# Patient Record
Sex: MALE | Race: WHITE | NOT HISPANIC OR LATINO | URBAN - METROPOLITAN AREA
[De-identification: names, ages, dates, MRNs, and addresses within clinical notes are randomized per-mention and may not be internally consistent; named-entity substitution may affect disease eponyms.]

---

## 2017-04-21 ENCOUNTER — OFFICE VISIT (OUTPATIENT)
Dept: INTERNAL MEDICINE | Facility: CLINIC | Age: 22
End: 2017-04-21

## 2017-04-21 VITALS
WEIGHT: 158 LBS | HEART RATE: 62 BPM | DIASTOLIC BLOOD PRESSURE: 80 MMHG | SYSTOLIC BLOOD PRESSURE: 138 MMHG | HEIGHT: 73 IN | OXYGEN SATURATION: 94 % | BODY MASS INDEX: 20.94 KG/M2

## 2017-04-21 DIAGNOSIS — F90.2 ATTENTION DEFICIT HYPERACTIVITY DISORDER (ADHD), COMBINED TYPE: Primary | ICD-10-CM

## 2017-04-21 PROCEDURE — 99203 OFFICE O/P NEW LOW 30 MIN: CPT | Performed by: INTERNAL MEDICINE

## 2017-04-21 RX ORDER — BUPROPION HYDROCHLORIDE 150 MG/1
TABLET ORAL
Qty: 60 TABLET | Refills: 3 | Status: SHIPPED | OUTPATIENT
Start: 2017-04-21 | End: 2017-05-25 | Stop reason: SDUPTHER

## 2017-04-21 NOTE — PROGRESS NOTES
"Establish Care; concentration; forgetfulness; and Autistic Spectrum    Subjective   Henry Paz is a 22 y.o. male is here today to establish.    History of Present Illness   Henry is here to establish. Notes that he has noted he would lose his concentration and fade off rather than focus on the task at hand.  Friends at work, thought he may have attention issues. He was a good student at school, did not have any problems , majorly with studies, but does note he needed a lot of help.      Current Outpatient Prescriptions:   •  buPROPion XL (WELLBUTRIN XL) 150 MG 24 hr tablet, Take 1 PO daily for 1 week then 2 tabs daily, Disp: 60 tablet, Rfl: 3      The following portions of the patient's history were reviewed and updated as appropriate: allergies, current medications, past family history, past medical history, past social history, past surgical history and problem list.    Review of Systems   Constitutional: Negative.  Negative for chills and fever.   HENT: Negative for ear discharge, ear pain, sinus pressure and sore throat.    Respiratory: Negative for cough, chest tightness and shortness of breath.    Cardiovascular: Negative for chest pain, palpitations and leg swelling.   Gastrointestinal: Negative for diarrhea, nausea and vomiting.   Musculoskeletal: Negative for arthralgias, back pain and myalgias.   Neurological: Negative for dizziness, syncope and headaches.   Psychiatric/Behavioral: Positive for decreased concentration. Negative for confusion and sleep disturbance. The patient is nervous/anxious.        Objective   /80  Pulse 62  Ht 73\" (185.4 cm)  Wt 158 lb (71.7 kg)  SpO2 94% Comment: ra  BMI 20.85 kg/m2     Physical Exam   Constitutional: He is oriented to person, place, and time. He appears well-developed and well-nourished.   HENT:   Head: Normocephalic and atraumatic.   Right Ear: External ear normal.   Left Ear: External ear normal.   Mouth/Throat: No oropharyngeal exudate.   Eyes: " Conjunctivae are normal. Pupils are equal, round, and reactive to light.   Neck: Neck supple. No thyromegaly present.   Cardiovascular: Normal rate and regular rhythm.    Pulmonary/Chest: Effort normal and breath sounds normal.   Musculoskeletal: He exhibits no edema.   Neurological: He is alert and oriented to person, place, and time. No cranial nerve deficit.   Psychiatric: Judgment normal. His mood appears anxious.   Nursing note and vitals reviewed.        No results found for this or any previous visit.          Assessment/Plan   Diagnoses and all orders for this visit:    Attention deficit hyperactivity disorder (ADHD), combined type  -     buPROPion XL (WELLBUTRIN XL) 150 MG 24 hr tablet; Take 1 PO daily for 1 week then 2 tabs daily             Checked ADD questionaire- scored 24 on the inattention and 24 on hyperactivity c/w ADHD.  He does not note digestion issues, but may benefit from allergy vs celiac panel with his labs at follow up.      Return in about 1 month (around 5/21/2017) for Recheck. will check labs and BP.

## 2017-05-25 ENCOUNTER — OFFICE VISIT (OUTPATIENT)
Dept: INTERNAL MEDICINE | Facility: CLINIC | Age: 22
End: 2017-05-25

## 2017-05-25 VITALS
BODY MASS INDEX: 19.87 KG/M2 | OXYGEN SATURATION: 99 % | HEART RATE: 90 BPM | DIASTOLIC BLOOD PRESSURE: 80 MMHG | WEIGHT: 150.6 LBS | SYSTOLIC BLOOD PRESSURE: 130 MMHG

## 2017-05-25 DIAGNOSIS — F90.2 ATTENTION DEFICIT HYPERACTIVITY DISORDER (ADHD), COMBINED TYPE: ICD-10-CM

## 2017-05-25 LAB
ALBUMIN SERPL-MCNC: 5 G/DL (ref 3.2–4.8)
ALBUMIN/GLOB SERPL: 1.9 G/DL (ref 1.5–2.5)
ALP SERPL-CCNC: 54 U/L (ref 25–100)
ALT SERPL W P-5'-P-CCNC: 11 U/L (ref 7–40)
ANION GAP SERPL CALCULATED.3IONS-SCNC: 3 MMOL/L (ref 3–11)
AST SERPL-CCNC: 14 U/L (ref 0–33)
BILIRUB SERPL-MCNC: 1 MG/DL (ref 0.3–1.2)
BUN BLD-MCNC: 15 MG/DL (ref 9–23)
BUN/CREAT SERPL: 16.7 (ref 7–25)
CALCIUM SPEC-SCNC: 10.6 MG/DL (ref 8.7–10.4)
CHLORIDE SERPL-SCNC: 108 MMOL/L (ref 99–109)
CO2 SERPL-SCNC: 29 MMOL/L (ref 20–31)
CREAT BLD-MCNC: 0.9 MG/DL (ref 0.6–1.3)
DEPRECATED RDW RBC AUTO: 41 FL (ref 37–54)
ERYTHROCYTE [DISTWIDTH] IN BLOOD BY AUTOMATED COUNT: 12.2 % (ref 11.3–14.5)
GFR SERPL CREATININE-BSD FRML MDRD: 108 ML/MIN/1.73
GLOBULIN UR ELPH-MCNC: 2.7 GM/DL
GLUCOSE BLD-MCNC: 99 MG/DL (ref 70–100)
HCT VFR BLD AUTO: 44.8 % (ref 38.9–50.9)
HGB BLD-MCNC: 15.2 G/DL (ref 13.1–17.5)
MCH RBC QN AUTO: 31 PG (ref 27–31)
MCHC RBC AUTO-ENTMCNC: 33.9 G/DL (ref 32–36)
MCV RBC AUTO: 91.2 FL (ref 80–99)
PLATELET # BLD AUTO: 208 10*3/MM3 (ref 150–450)
PMV BLD AUTO: 10.9 FL (ref 6–12)
POTASSIUM BLD-SCNC: 4.5 MMOL/L (ref 3.5–5.5)
PROT SERPL-MCNC: 7.7 G/DL (ref 5.7–8.2)
RBC # BLD AUTO: 4.91 10*6/MM3 (ref 4.2–5.76)
SODIUM BLD-SCNC: 140 MMOL/L (ref 132–146)
TSH SERPL DL<=0.05 MIU/L-ACNC: 1.56 MIU/ML (ref 0.35–5.35)
VIT B12 BLD-MCNC: 486 PG/ML (ref 211–911)
WBC NRBC COR # BLD: 4.67 10*3/MM3 (ref 4.5–13.5)

## 2017-05-25 PROCEDURE — 80053 COMPREHEN METABOLIC PANEL: CPT | Performed by: INTERNAL MEDICINE

## 2017-05-25 PROCEDURE — 85027 COMPLETE CBC AUTOMATED: CPT | Performed by: INTERNAL MEDICINE

## 2017-05-25 PROCEDURE — 99213 OFFICE O/P EST LOW 20 MIN: CPT | Performed by: INTERNAL MEDICINE

## 2017-05-25 PROCEDURE — 82607 VITAMIN B-12: CPT | Performed by: INTERNAL MEDICINE

## 2017-05-25 PROCEDURE — 84443 ASSAY THYROID STIM HORMONE: CPT | Performed by: INTERNAL MEDICINE

## 2017-05-25 RX ORDER — BUPROPION HYDROCHLORIDE 300 MG/1
300 TABLET ORAL EVERY MORNING
Qty: 30 TABLET | Refills: 5 | Status: SHIPPED | OUTPATIENT
Start: 2017-05-25 | End: 2017-06-27 | Stop reason: SDUPTHER

## 2017-05-25 RX ORDER — ATOMOXETINE 40 MG/1
40 CAPSULE ORAL DAILY
Qty: 30 CAPSULE | Refills: 2 | Status: SHIPPED | OUTPATIENT
Start: 2017-05-25 | End: 2017-06-27 | Stop reason: CLARIF

## 2017-05-26 ENCOUNTER — TELEPHONE (OUTPATIENT)
Dept: INTERNAL MEDICINE | Facility: CLINIC | Age: 22
End: 2017-05-26

## 2017-05-31 ENCOUNTER — TELEPHONE (OUTPATIENT)
Dept: INTERNAL MEDICINE | Facility: CLINIC | Age: 22
End: 2017-05-31

## 2017-06-02 NOTE — TELEPHONE ENCOUNTER
Henry's mother called today to follow up on the call earlier this week regarding the cost of Henry's medication. She also has questions regarding the Welbutrin he is taking.     [phone] Celia Paz

## 2017-06-02 NOTE — TELEPHONE ENCOUNTER
Spoke to Celia, states the script is $80.00 after insurance paid.  Advised of Cherelle lopez, states she will  on Monday.

## 2017-06-06 NOTE — TELEPHONE ENCOUNTER
CLARISA'S MOTHER STATES THAT AFTER COUPON, THIS RX IS STILL TOO EXPENSIVE, CLARISA IS STILL TAKING WELLBUTRIN. SHE WOULD LIKE TO KNOW IF THERE IS AN ALTERNATIVE TO STRATERA.

## 2017-06-06 NOTE — TELEPHONE ENCOUNTER
The alternatives are stimulants. Can he come in Thursday or Friday am, and I can start him onit.    thanks

## 2017-06-06 NOTE — TELEPHONE ENCOUNTER
Is there anything else he can take?  Pt has told his mother that Wellbutrin is not helping either.

## 2017-06-07 NOTE — TELEPHONE ENCOUNTER
Left a detailed message for Mom to call back to sched & I would work pt in for tomorrow or Friday.

## 2017-06-27 ENCOUNTER — OFFICE VISIT (OUTPATIENT)
Dept: INTERNAL MEDICINE | Facility: CLINIC | Age: 22
End: 2017-06-27

## 2017-06-27 VITALS
SYSTOLIC BLOOD PRESSURE: 144 MMHG | DIASTOLIC BLOOD PRESSURE: 82 MMHG | OXYGEN SATURATION: 99 % | HEART RATE: 86 BPM | BODY MASS INDEX: 20.08 KG/M2 | WEIGHT: 152.2 LBS

## 2017-06-27 DIAGNOSIS — F90.2 ATTENTION DEFICIT HYPERACTIVITY DISORDER (ADHD), COMBINED TYPE: Primary | ICD-10-CM

## 2017-06-27 PROCEDURE — 99213 OFFICE O/P EST LOW 20 MIN: CPT | Performed by: INTERNAL MEDICINE

## 2017-06-27 RX ORDER — BUPROPION HYDROCHLORIDE 150 MG/1
150 TABLET ORAL EVERY MORNING
Qty: 30 TABLET | Refills: 1 | Status: SHIPPED | OUTPATIENT
Start: 2017-06-27 | End: 2017-08-07

## 2017-06-27 NOTE — PROGRESS NOTES
ADHD    Subjective   Henry Paz is a 22 y.o. male is here today for follow-up.    History of Present Illness   hERE FOR F/U ON HIS add, StRATTERA TOO EXPENSIVE, WELLBUTRIN NOT HELPING    Current Outpatient Prescriptions:   •  buPROPion XL (WELLBUTRIN XL) 150 MG 24 hr tablet, Take 1 tablet by mouth Every Morning., Disp: 30 tablet, Rfl: 1  •  lisdexamfetamine (VYVANSE) 20 MG capsule, Take 1 capsule by mouth Every Morning., Disp: 30 capsule, Rfl: 0      The following portions of the patient's history were reviewed and updated as appropriate: allergies, current medications, past family history, past medical history, past social history, past surgical history and problem list.    Review of Systems   Constitutional: Negative.  Negative for chills and fever.   HENT: Negative for ear discharge, ear pain, sinus pressure and sore throat.    Respiratory: Negative for cough, chest tightness and shortness of breath.    Cardiovascular: Negative for chest pain, palpitations and leg swelling.   Gastrointestinal: Negative for diarrhea, nausea and vomiting.   Musculoskeletal: Negative for arthralgias, back pain and myalgias.   Neurological: Negative for dizziness, syncope and headaches.   Psychiatric/Behavioral: Positive for decreased concentration. Negative for confusion and sleep disturbance.       Objective   /82  Pulse 86  Wt 152 lb 3.2 oz (69 kg)  SpO2 99% Comment: ra  BMI 20.08 kg/m2  Physical Exam   Constitutional: He is oriented to person, place, and time. He appears well-developed and well-nourished.   HENT:   Head: Normocephalic and atraumatic.   Right Ear: External ear normal.   Left Ear: External ear normal.   Mouth/Throat: No oropharyngeal exudate.   Eyes: Conjunctivae are normal. Pupils are equal, round, and reactive to light.   Neck: Neck supple. No thyromegaly present.   Cardiovascular: Normal rate, regular rhythm and intact distal pulses.    Pulmonary/Chest: Effort normal and breath sounds normal.    Abdominal: Soft. Bowel sounds are normal. He exhibits no distension. There is no tenderness.   Musculoskeletal: He exhibits no edema.   Neurological: He is alert and oriented to person, place, and time. No cranial nerve deficit.   Skin: Skin is warm and dry.   Psychiatric: He has a normal mood and affect. Judgment normal.   Nursing note and vitals reviewed.        Results for orders placed or performed in visit on 05/25/17   TSH   Result Value Ref Range    TSH 1.558 0.350 - 5.350 mIU/mL   Comprehensive Metabolic Panel   Result Value Ref Range    Glucose 99 70 - 100 mg/dL    BUN 15 9 - 23 mg/dL    Creatinine 0.90 0.60 - 1.30 mg/dL    Sodium 140 132 - 146 mmol/L    Potassium 4.5 3.5 - 5.5 mmol/L    Chloride 108 99 - 109 mmol/L    CO2 29.0 20.0 - 31.0 mmol/L    Calcium 10.6 (H) 8.7 - 10.4 mg/dL    Total Protein 7.7 5.7 - 8.2 g/dL    Albumin 5.00 (H) 3.20 - 4.80 g/dL    ALT (SGPT) 11 7 - 40 U/L    AST (SGOT) 14 0 - 33 U/L    Alkaline Phosphatase 54 25 - 100 U/L    Total Bilirubin 1.0 0.3 - 1.2 mg/dL    eGFR Non African Amer 108 >60 mL/min/1.73    Globulin 2.7 gm/dL    A/G Ratio 1.9 1.5 - 2.5 g/dL    BUN/Creatinine Ratio 16.7 7.0 - 25.0    Anion Gap 3.0 3.0 - 11.0 mmol/L   CBC (No Diff)   Result Value Ref Range    WBC 4.67 4.50 - 13.50 10*3/mm3    RBC 4.91 4.20 - 5.76 10*6/mm3    Hemoglobin 15.2 13.1 - 17.5 g/dL    Hematocrit 44.8 38.9 - 50.9 %    MCV 91.2 80.0 - 99.0 fL    MCH 31.0 27.0 - 31.0 pg    MCHC 33.9 32.0 - 36.0 g/dL    RDW 12.2 11.3 - 14.5 %    RDW-SD 41.0 37.0 - 54.0 fl    MPV 10.9 6.0 - 12.0 fL    Platelets 208 150 - 450 10*3/mm3   Vitamin B12   Result Value Ref Range    Vitamin B-12 486 211 - 911 pg/mL             Assessment/Plan   Diagnoses and all orders for this visit:    Attention deficit hyperactivity disorder (ADHD), combined type  -     lisdexamfetamine (VYVANSE) 20 MG capsule; Take 1 capsule by mouth Every Morning.  -     buPROPion XL (WELLBUTRIN XL) 150 MG 24 hr tablet; Take 1 tablet by mouth  Every Morning.           Wean off wellbutrin., cut back to 150 daily.  Monitor BP and call if high.      Return in about 1 month (around 7/27/2017) for Recheck.

## 2017-06-30 ENCOUNTER — TELEPHONE (OUTPATIENT)
Dept: INTERNAL MEDICINE | Facility: CLINIC | Age: 22
End: 2017-06-30

## 2017-06-30 DIAGNOSIS — F90.2 ATTENTION DEFICIT HYPERACTIVITY DISORDER (ADHD), COMBINED TYPE: Primary | ICD-10-CM

## 2017-06-30 RX ORDER — DEXTROAMPHETAMINE SACCHARATE, AMPHETAMINE ASPARTATE MONOHYDRATE, DEXTROAMPHETAMINE SULFATE AND AMPHETAMINE SULFATE 3.75; 3.75; 3.75; 3.75 MG/1; MG/1; MG/1; MG/1
15 CAPSULE, EXTENDED RELEASE ORAL EVERY MORNING
Qty: 30 CAPSULE | Refills: 0 | Status: SHIPPED | OUTPATIENT
Start: 2017-06-30 | End: 2017-08-07 | Stop reason: SDUPTHER

## 2017-07-03 ENCOUNTER — TELEPHONE (OUTPATIENT)
Dept: INTERNAL MEDICINE | Facility: CLINIC | Age: 22
End: 2017-07-03

## 2017-07-03 NOTE — TELEPHONE ENCOUNTER
PATIENTS MOTHER SAYS THE ADEROL IS CHEAPER THAN THE OTHER MEDICINE SO PATIENT RATHER HAS ADEROL. PATIENTS MOTHER SAID THEY NEED THE PRE AUTHORIZATION FOR THE MEDICINE AND THE PHARMACY JUST FAXED IT TO THE OFFICE. PATIENTS MOTHER SAYS PATIENT NEEDS MEDICINE AS AS POSSIBLE. THANK YOU.

## 2017-08-07 ENCOUNTER — OFFICE VISIT (OUTPATIENT)
Dept: INTERNAL MEDICINE | Facility: CLINIC | Age: 22
End: 2017-08-07

## 2017-08-07 VITALS
WEIGHT: 156 LBS | SYSTOLIC BLOOD PRESSURE: 120 MMHG | BODY MASS INDEX: 20.58 KG/M2 | HEART RATE: 63 BPM | OXYGEN SATURATION: 99 % | DIASTOLIC BLOOD PRESSURE: 78 MMHG

## 2017-08-07 DIAGNOSIS — F90.2 ATTENTION DEFICIT HYPERACTIVITY DISORDER (ADHD), COMBINED TYPE: ICD-10-CM

## 2017-08-07 DIAGNOSIS — Z82.69 FAMILY HISTORY OF SCOLIOSIS: Primary | ICD-10-CM

## 2017-08-07 PROCEDURE — 99213 OFFICE O/P EST LOW 20 MIN: CPT | Performed by: INTERNAL MEDICINE

## 2017-08-07 RX ORDER — DEXTROAMPHETAMINE SACCHARATE, AMPHETAMINE ASPARTATE MONOHYDRATE, DEXTROAMPHETAMINE SULFATE AND AMPHETAMINE SULFATE 5; 5; 5; 5 MG/1; MG/1; MG/1; MG/1
20 CAPSULE, EXTENDED RELEASE ORAL EVERY MORNING
Qty: 30 CAPSULE | Refills: 0 | Status: SHIPPED | OUTPATIENT
Start: 2017-08-07 | End: 2017-09-07 | Stop reason: SDUPTHER

## 2017-08-07 NOTE — PROGRESS NOTES
Follow-up (ADHD)    Subjective   Henry Paz is a 22 y.o. male is here today for follow-up.    History of Present Illness   Henry was started on the Adderall lat visit, and notes he does well the 1st 2-4 hrs on the med, and then it wears off.Feels less distracted by the things around him when it works.  Wellbutrin did not help. Was able to wean off of it entirely.  Also , has a fam h/o scolioses, and would like to know if he does?    Current Outpatient Prescriptions:   •  amphetamine-dextroamphetamine XR (ADDERALL XR) 20 MG 24 hr capsule, Take 1 capsule by mouth Every Morning., Disp: 30 capsule, Rfl: 0      The following portions of the patient's history were reviewed and updated as appropriate: allergies, current medications, past family history, past medical history, past social history, past surgical history and problem list.    Review of Systems   Constitutional: Negative for activity change, appetite change, fatigue and unexpected weight change.   Cardiovascular: Negative for chest pain and palpitations.   Musculoskeletal: Negative for back pain, gait problem and myalgias.   Psychiatric/Behavioral: Positive for decreased concentration. The patient is not nervous/anxious.        Objective   /78  Pulse 63  Wt 156 lb (70.8 kg)  SpO2 99%  BMI 20.58 kg/m2  Physical Exam   Constitutional: He is oriented to person, place, and time. He appears well-developed and well-nourished.   HENT:   Head: Normocephalic and atraumatic.   Mouth/Throat: No oropharyngeal exudate.   Eyes: Conjunctivae are normal. Pupils are equal, round, and reactive to light.   Neck: Neck supple. No thyromegaly present.   Cardiovascular: Normal rate and regular rhythm.    Pulmonary/Chest: Effort normal and breath sounds normal.   Abdominal: Soft. Bowel sounds are normal. He exhibits no distension. There is no tenderness.   Musculoskeletal: He exhibits no edema or tenderness (mildly curved T-spine to the rt).   Neurological: He is alert  and oriented to person, place, and time. No cranial nerve deficit.   Skin: Skin is warm and dry.   Psychiatric: He has a normal mood and affect. Judgment normal.   Nursing note and vitals reviewed.        Results for orders placed or performed in visit on 05/25/17   TSH   Result Value Ref Range    TSH 1.558 0.350 - 5.350 mIU/mL   Comprehensive Metabolic Panel   Result Value Ref Range    Glucose 99 70 - 100 mg/dL    BUN 15 9 - 23 mg/dL    Creatinine 0.90 0.60 - 1.30 mg/dL    Sodium 140 132 - 146 mmol/L    Potassium 4.5 3.5 - 5.5 mmol/L    Chloride 108 99 - 109 mmol/L    CO2 29.0 20.0 - 31.0 mmol/L    Calcium 10.6 (H) 8.7 - 10.4 mg/dL    Total Protein 7.7 5.7 - 8.2 g/dL    Albumin 5.00 (H) 3.20 - 4.80 g/dL    ALT (SGPT) 11 7 - 40 U/L    AST (SGOT) 14 0 - 33 U/L    Alkaline Phosphatase 54 25 - 100 U/L    Total Bilirubin 1.0 0.3 - 1.2 mg/dL    eGFR Non African Amer 108 >60 mL/min/1.73    Globulin 2.7 gm/dL    A/G Ratio 1.9 1.5 - 2.5 g/dL    BUN/Creatinine Ratio 16.7 7.0 - 25.0    Anion Gap 3.0 3.0 - 11.0 mmol/L   CBC (No Diff)   Result Value Ref Range    WBC 4.67 4.50 - 13.50 10*3/mm3    RBC 4.91 4.20 - 5.76 10*6/mm3    Hemoglobin 15.2 13.1 - 17.5 g/dL    Hematocrit 44.8 38.9 - 50.9 %    MCV 91.2 80.0 - 99.0 fL    MCH 31.0 27.0 - 31.0 pg    MCHC 33.9 32.0 - 36.0 g/dL    RDW 12.2 11.3 - 14.5 %    RDW-SD 41.0 37.0 - 54.0 fl    MPV 10.9 6.0 - 12.0 fL    Platelets 208 150 - 450 10*3/mm3   Vitamin B12   Result Value Ref Range    Vitamin B-12 486 211 - 911 pg/mL             Assessment/Plan   Diagnoses and all orders for this visit:    Family history of scoliosis  -     XR spine scoliosis 2 or 3 views; Future    Attention deficit hyperactivity disorder (ADHD), combined type  -     amphetamine-dextroamphetamine XR (ADDERALL XR) 20 MG 24 hr capsule; Take 1 capsule by mouth Every Morning.      Increase dose and rtc in mo for recheck, change med if no improvement.           Return in about 1 month (around 9/7/2017) for Recheck.

## 2017-09-07 ENCOUNTER — OFFICE VISIT (OUTPATIENT)
Dept: INTERNAL MEDICINE | Facility: CLINIC | Age: 22
End: 2017-09-07

## 2017-09-07 VITALS
HEART RATE: 62 BPM | DIASTOLIC BLOOD PRESSURE: 68 MMHG | WEIGHT: 158.07 LBS | BODY MASS INDEX: 20.85 KG/M2 | SYSTOLIC BLOOD PRESSURE: 128 MMHG | OXYGEN SATURATION: 99 %

## 2017-09-07 DIAGNOSIS — F90.2 ATTENTION DEFICIT HYPERACTIVITY DISORDER (ADHD), COMBINED TYPE: ICD-10-CM

## 2017-09-07 PROCEDURE — 99213 OFFICE O/P EST LOW 20 MIN: CPT | Performed by: INTERNAL MEDICINE

## 2017-09-07 RX ORDER — DEXTROAMPHETAMINE SACCHARATE, AMPHETAMINE ASPARTATE MONOHYDRATE, DEXTROAMPHETAMINE SULFATE AND AMPHETAMINE SULFATE 5; 5; 5; 5 MG/1; MG/1; MG/1; MG/1
20 CAPSULE, EXTENDED RELEASE ORAL EVERY MORNING
Qty: 30 CAPSULE | Refills: 0 | Status: SHIPPED | OUTPATIENT
Start: 2017-09-07 | End: 2017-11-07 | Stop reason: SDUPTHER

## 2017-09-07 RX ORDER — DEXTROAMPHETAMINE SACCHARATE, AMPHETAMINE ASPARTATE MONOHYDRATE, DEXTROAMPHETAMINE SULFATE AND AMPHETAMINE SULFATE 5; 5; 5; 5 MG/1; MG/1; MG/1; MG/1
20 CAPSULE, EXTENDED RELEASE ORAL EVERY MORNING
Qty: 30 CAPSULE | Refills: 0 | Status: SHIPPED | OUTPATIENT
Start: 2017-09-07 | End: 2017-09-07 | Stop reason: SDUPTHER

## 2017-09-07 NOTE — PROGRESS NOTES
Follow-up (ADHD)    Subjective   Henry Paz is a 22 y.o. male is here today for follow-up.    History of Present Illness   Seems to wear off around 2 pm. Changed to the 20mg last visit, feels like it lasts little longer.Better attention and focus.  Denies any cp, palps or weight loss.  Has not had his scolioses xrays yet.    Current Outpatient Prescriptions:   •  amphetamine-dextroamphetamine XR (ADDERALL XR) 20 MG 24 hr capsule, Take 1 capsule by mouth Every Morning. Fill on or after 10/7/17, Disp: 30 capsule, Rfl: 0      The following portions of the patient's history were reviewed and updated as appropriate: allergies, current medications, past family history, past medical history, past social history, past surgical history and problem list.    Review of Systems   Constitutional: Negative.  Negative for chills and fever.   HENT: Negative for ear discharge, ear pain, sinus pressure and sore throat.    Respiratory: Negative for cough, chest tightness and shortness of breath.    Cardiovascular: Negative for chest pain, palpitations and leg swelling.   Gastrointestinal: Negative for diarrhea, nausea and vomiting.   Musculoskeletal: Negative for arthralgias, back pain and myalgias.   Neurological: Negative for dizziness, syncope and headaches.   Psychiatric/Behavioral: Positive for decreased concentration (better). Negative for confusion and sleep disturbance. The patient is nervous/anxious.        Objective   /68  Pulse 62  Wt 158 lb 1.1 oz (71.7 kg)  SpO2 99%  BMI 20.85 kg/m2  Physical Exam   Constitutional: He is oriented to person, place, and time. He appears well-developed and well-nourished.   HENT:   Head: Normocephalic and atraumatic.   Mouth/Throat: No oropharyngeal exudate.   Eyes: Conjunctivae are normal. Pupils are equal, round, and reactive to light.   Neck: Neck supple. No thyromegaly present.   Cardiovascular: Normal rate and regular rhythm.    Pulmonary/Chest: Effort normal and breath  sounds normal.   Abdominal: Soft. Bowel sounds are normal. He exhibits no distension. There is no tenderness.   Musculoskeletal: He exhibits no edema or tenderness.   Neurological: He is alert and oriented to person, place, and time. No cranial nerve deficit.   Skin: Skin is warm and dry.   Psychiatric: He has a normal mood and affect. Judgment normal.   Nursing note and vitals reviewed.        Results for orders placed or performed in visit on 05/25/17   TSH   Result Value Ref Range    TSH 1.558 0.350 - 5.350 mIU/mL   Comprehensive Metabolic Panel   Result Value Ref Range    Glucose 99 70 - 100 mg/dL    BUN 15 9 - 23 mg/dL    Creatinine 0.90 0.60 - 1.30 mg/dL    Sodium 140 132 - 146 mmol/L    Potassium 4.5 3.5 - 5.5 mmol/L    Chloride 108 99 - 109 mmol/L    CO2 29.0 20.0 - 31.0 mmol/L    Calcium 10.6 (H) 8.7 - 10.4 mg/dL    Total Protein 7.7 5.7 - 8.2 g/dL    Albumin 5.00 (H) 3.20 - 4.80 g/dL    ALT (SGPT) 11 7 - 40 U/L    AST (SGOT) 14 0 - 33 U/L    Alkaline Phosphatase 54 25 - 100 U/L    Total Bilirubin 1.0 0.3 - 1.2 mg/dL    eGFR Non African Amer 108 >60 mL/min/1.73    Globulin 2.7 gm/dL    A/G Ratio 1.9 1.5 - 2.5 g/dL    BUN/Creatinine Ratio 16.7 7.0 - 25.0    Anion Gap 3.0 3.0 - 11.0 mmol/L   CBC (No Diff)   Result Value Ref Range    WBC 4.67 4.50 - 13.50 10*3/mm3    RBC 4.91 4.20 - 5.76 10*6/mm3    Hemoglobin 15.2 13.1 - 17.5 g/dL    Hematocrit 44.8 38.9 - 50.9 %    MCV 91.2 80.0 - 99.0 fL    MCH 31.0 27.0 - 31.0 pg    MCHC 33.9 32.0 - 36.0 g/dL    RDW 12.2 11.3 - 14.5 %    RDW-SD 41.0 37.0 - 54.0 fl    MPV 10.9 6.0 - 12.0 fL    Platelets 208 150 - 450 10*3/mm3   Vitamin B12   Result Value Ref Range    Vitamin B-12 486 211 - 911 pg/mL             Assessment/Plan   Diagnoses and all orders for this visit:    Attention deficit hyperactivity disorder (ADHD), combined type  -     Discontinue: amphetamine-dextroamphetamine XR (ADDERALL XR) 20 MG 24 hr capsule; Take 1 capsule by mouth Every Morning.  -      amphetamine-dextroamphetamine XR (ADDERALL XR) 20 MG 24 hr capsule; Take 1 capsule by mouth Every Morning. Fill on or after 10/7/17      Continue current meds. Will gets aiden and MERYL at follow up.  The patient has read and signed the [facility] Controlled Substance Contract.  I will continue to see patient for regular follow up appointments.  They are well controlled on their medication.  AIDEN has been reviewed by me and is updated every 3 months. The patient is aware of the potential for addiction and dependence.             Return in about 2 months (around 11/7/2017) for Recheck.

## 2017-11-07 ENCOUNTER — OFFICE VISIT (OUTPATIENT)
Dept: INTERNAL MEDICINE | Facility: CLINIC | Age: 22
End: 2017-11-07

## 2017-11-07 VITALS
BODY MASS INDEX: 21.77 KG/M2 | HEART RATE: 68 BPM | SYSTOLIC BLOOD PRESSURE: 120 MMHG | WEIGHT: 165 LBS | DIASTOLIC BLOOD PRESSURE: 78 MMHG | OXYGEN SATURATION: 99 %

## 2017-11-07 DIAGNOSIS — F90.2 ATTENTION DEFICIT HYPERACTIVITY DISORDER (ADHD), COMBINED TYPE: ICD-10-CM

## 2017-11-07 PROCEDURE — 99213 OFFICE O/P EST LOW 20 MIN: CPT | Performed by: INTERNAL MEDICINE

## 2017-11-07 RX ORDER — DEXTROAMPHETAMINE SACCHARATE, AMPHETAMINE ASPARTATE MONOHYDRATE, DEXTROAMPHETAMINE SULFATE AND AMPHETAMINE SULFATE 5; 5; 5; 5 MG/1; MG/1; MG/1; MG/1
20 CAPSULE, EXTENDED RELEASE ORAL EVERY MORNING
Qty: 30 CAPSULE | Refills: 0 | Status: SHIPPED | OUTPATIENT
Start: 2017-11-07 | End: 2018-01-09 | Stop reason: SDUPTHER

## 2017-11-07 RX ORDER — BUPROPION HYDROCHLORIDE 150 MG/1
150 TABLET ORAL DAILY
Qty: 30 TABLET | Refills: 3 | Status: SHIPPED | OUTPATIENT
Start: 2017-11-07 | End: 2018-01-09

## 2017-11-07 RX ORDER — DEXTROAMPHETAMINE SACCHARATE, AMPHETAMINE ASPARTATE MONOHYDRATE, DEXTROAMPHETAMINE SULFATE AND AMPHETAMINE SULFATE 5; 5; 5; 5 MG/1; MG/1; MG/1; MG/1
20 CAPSULE, EXTENDED RELEASE ORAL EVERY MORNING
Qty: 30 CAPSULE | Refills: 0 | Status: SHIPPED | OUTPATIENT
Start: 2017-11-07 | End: 2017-11-07 | Stop reason: SDUPTHER

## 2017-11-07 NOTE — PROGRESS NOTES
Follow-up (ADHD)    Subjective   Henry Paz is a 22 y.o. male is here today for follow-up.    History of Present Illness   Henry is here for a follow up onhis ADD, notes his symptoms are better on the current regimen. Taking the meds as prescribed, denies side effects, no cp, palps, or weight loss.  Feels like its not lasting as long as it should.      Current Outpatient Prescriptions:   •  amphetamine-dextroamphetamine XR (ADDERALL XR) 20 MG 24 hr capsule, Take 1 capsule by mouth Every Morning. Fill on or after 12/7/17, Disp: 30 capsule, Rfl: 0  •  buPROPion XL (WELLBUTRIN XL) 150 MG 24 hr tablet, Take 1 tablet by mouth Daily., Disp: 30 tablet, Rfl: 3      The following portions of the patient's history were reviewed and updated as appropriate: allergies, current medications, past family history, past medical history, past social history, past surgical history and problem list.    Review of Systems   Constitutional: Negative.  Negative for chills and fever.   HENT: Negative for ear discharge, ear pain, sinus pressure and sore throat.    Respiratory: Negative for cough, chest tightness and shortness of breath.    Cardiovascular: Negative for chest pain, palpitations and leg swelling.   Gastrointestinal: Negative for diarrhea, nausea and vomiting.   Musculoskeletal: Negative for arthralgias, back pain and myalgias.   Neurological: Negative for dizziness, syncope and headaches.   Psychiatric/Behavioral: Positive for decreased concentration (better). Negative for confusion and sleep disturbance. The patient is nervous/anxious.        Objective   /78  Pulse 68  Wt 165 lb (74.8 kg)  SpO2 99%  BMI 21.77 kg/m2  Physical Exam   Constitutional: He is oriented to person, place, and time. He appears well-developed and well-nourished.   HENT:   Head: Normocephalic and atraumatic.   Mouth/Throat: No oropharyngeal exudate.   Eyes: Conjunctivae are normal. Pupils are equal, round, and reactive to light.   Neck: Neck  supple. No thyromegaly present.   Cardiovascular: Normal rate and regular rhythm.    Pulmonary/Chest: Effort normal and breath sounds normal.   Abdominal: Soft. Bowel sounds are normal. He exhibits no distension. There is no tenderness.   Musculoskeletal: He exhibits no edema or tenderness.   Neurological: He is alert and oriented to person, place, and time. No cranial nerve deficit.   Skin: Skin is warm and dry.   Psychiatric: He has a normal mood and affect. Judgment normal.   Nursing note and vitals reviewed.        Results for orders placed or performed in visit on 05/25/17   TSH   Result Value Ref Range    TSH 1.558 0.350 - 5.350 mIU/mL   Comprehensive Metabolic Panel   Result Value Ref Range    Glucose 99 70 - 100 mg/dL    BUN 15 9 - 23 mg/dL    Creatinine 0.90 0.60 - 1.30 mg/dL    Sodium 140 132 - 146 mmol/L    Potassium 4.5 3.5 - 5.5 mmol/L    Chloride 108 99 - 109 mmol/L    CO2 29.0 20.0 - 31.0 mmol/L    Calcium 10.6 (H) 8.7 - 10.4 mg/dL    Total Protein 7.7 5.7 - 8.2 g/dL    Albumin 5.00 (H) 3.20 - 4.80 g/dL    ALT (SGPT) 11 7 - 40 U/L    AST (SGOT) 14 0 - 33 U/L    Alkaline Phosphatase 54 25 - 100 U/L    Total Bilirubin 1.0 0.3 - 1.2 mg/dL    eGFR Non African Amer 108 >60 mL/min/1.73    Globulin 2.7 gm/dL    A/G Ratio 1.9 1.5 - 2.5 g/dL    BUN/Creatinine Ratio 16.7 7.0 - 25.0    Anion Gap 3.0 3.0 - 11.0 mmol/L   CBC (No Diff)   Result Value Ref Range    WBC 4.67 4.50 - 13.50 10*3/mm3    RBC 4.91 4.20 - 5.76 10*6/mm3    Hemoglobin 15.2 13.1 - 17.5 g/dL    Hematocrit 44.8 38.9 - 50.9 %    MCV 91.2 80.0 - 99.0 fL    MCH 31.0 27.0 - 31.0 pg    MCHC 33.9 32.0 - 36.0 g/dL    RDW 12.2 11.3 - 14.5 %    RDW-SD 41.0 37.0 - 54.0 fl    MPV 10.9 6.0 - 12.0 fL    Platelets 208 150 - 450 10*3/mm3   Vitamin B12   Result Value Ref Range    Vitamin B-12 486 211 - 911 pg/mL             Assessment/Plan   Diagnoses and all orders for this visit:    Attention deficit hyperactivity disorder (ADHD), combined type  -      buPROPion XL (WELLBUTRIN XL) 150 MG 24 hr tablet; Take 1 tablet by mouth Daily.  -     Discontinue: amphetamine-dextroamphetamine XR (ADDERALL XR) 20 MG 24 hr capsule; Take 1 capsule by mouth Every Morning.  -     amphetamine-dextroamphetamine XR (ADDERALL XR) 20 MG 24 hr capsule; Take 1 capsule by mouth Every Morning. Fill on or after 12/7/17      Continue adderall at current dose, restart wellbutrin at a low dose, adv. Since coverage not great on the stimulants, will leave at 1/2 dose and not max it this early in his treatment stage.     The patient has read and signed the [facility] Controlled Substance Contract.  I will continue to see patient for regular follow up appointments.  They are well controlled on their medication.  AIDEN has been reviewed by me and is updated every 3 months. The patient is aware of the potential for addiction and dependence.        Return in about 2 months (around 1/7/2018) for Recheck.

## 2018-01-08 ENCOUNTER — TELEPHONE (OUTPATIENT)
Dept: INTERNAL MEDICINE | Facility: CLINIC | Age: 23
End: 2018-01-08

## 2018-01-08 NOTE — TELEPHONE ENCOUNTER
PT HAS AN APPT TOMORROW 1/9/18 @ 9:15  SHE WANTED TO TALK WITH YOU REGARDING PT    PLEASE CALL TODAY [phone]

## 2018-01-08 NOTE — TELEPHONE ENCOUNTER
Called pt mother Celia, FOSTER reviewed.   Celia would like to talk to Dr. Jones directly. I explained Dr. Jones is in clinic right now that it would be later until she may see the message. Celia stated she understood but would like to speak with Dr. Jones before pts appt tomorrow.

## 2018-01-09 ENCOUNTER — OFFICE VISIT (OUTPATIENT)
Dept: INTERNAL MEDICINE | Facility: CLINIC | Age: 23
End: 2018-01-09

## 2018-01-09 VITALS
SYSTOLIC BLOOD PRESSURE: 126 MMHG | WEIGHT: 164.4 LBS | BODY MASS INDEX: 22.27 KG/M2 | RESPIRATION RATE: 20 BRPM | HEART RATE: 57 BPM | DIASTOLIC BLOOD PRESSURE: 80 MMHG | OXYGEN SATURATION: 98 % | HEIGHT: 72 IN

## 2018-01-09 DIAGNOSIS — F90.2 ATTENTION DEFICIT HYPERACTIVITY DISORDER (ADHD), COMBINED TYPE: ICD-10-CM

## 2018-01-09 PROCEDURE — 99213 OFFICE O/P EST LOW 20 MIN: CPT | Performed by: INTERNAL MEDICINE

## 2018-01-09 RX ORDER — DEXTROAMPHETAMINE SACCHARATE, AMPHETAMINE ASPARTATE MONOHYDRATE, DEXTROAMPHETAMINE SULFATE AND AMPHETAMINE SULFATE 5; 5; 5; 5 MG/1; MG/1; MG/1; MG/1
20 CAPSULE, EXTENDED RELEASE ORAL EVERY MORNING
Qty: 30 CAPSULE | Refills: 0 | Status: SHIPPED | OUTPATIENT
Start: 2018-01-09 | End: 2018-03-06

## 2018-01-09 RX ORDER — DEXTROAMPHETAMINE SACCHARATE, AMPHETAMINE ASPARTATE MONOHYDRATE, DEXTROAMPHETAMINE SULFATE AND AMPHETAMINE SULFATE 5; 5; 5; 5 MG/1; MG/1; MG/1; MG/1
20 CAPSULE, EXTENDED RELEASE ORAL EVERY MORNING
Qty: 30 CAPSULE | Refills: 0 | Status: SHIPPED | OUTPATIENT
Start: 2018-01-09 | End: 2018-01-09 | Stop reason: SDUPTHER

## 2018-01-09 RX ORDER — ESCITALOPRAM OXALATE 5 MG/1
5 TABLET ORAL DAILY
Qty: 30 TABLET | Refills: 3 | Status: SHIPPED | OUTPATIENT
Start: 2018-01-09 | End: 2018-03-06

## 2018-01-09 NOTE — TELEPHONE ENCOUNTER
Called pt mother and she explained you can actually call her later. Mother stated that we may have been calling pt's # instead of hers.   Would like a call back [phone] , at your earliest convenience.

## 2018-01-09 NOTE — TELEPHONE ENCOUNTER
Tried calling multiple times and received a busy tone.  Please check with Mom if there's something she needs done urgently.  Thank you

## 2018-01-09 NOTE — PROGRESS NOTES
"ADHD    Subjective   Henry Paz is a 22 y.o. male is here today for follow-up.    History of Present Illness   Has not been able to tolerate the side effects on the wellbutrin, hence stopped it.  Adderall helps, uncure if lasting as long as he needs it to.  Taking it daily , even when not in school.    Current Outpatient Prescriptions:   •  amphetamine-dextroamphetamine XR (ADDERALL XR) 20 MG 24 hr capsule, Take 1 capsule by mouth Every Morning Fill after 2/9/18, Disp: 30 capsule, Rfl: 0  •  escitalopram (LEXAPRO) 5 MG tablet, Take 1 tablet by mouth Daily., Disp: 30 tablet, Rfl: 3      The following portions of the patient's history were reviewed and updated as appropriate: allergies, current medications, past family history, past medical history, past social history, past surgical history and problem list.    Review of Systems   Constitutional: Negative.  Negative for chills and fever.   HENT: Negative for ear discharge, ear pain, sinus pressure and sore throat.    Respiratory: Negative for cough, chest tightness and shortness of breath.    Cardiovascular: Negative for chest pain, palpitations and leg swelling.   Gastrointestinal: Negative for diarrhea, nausea and vomiting.   Musculoskeletal: Negative for arthralgias, back pain and myalgias.   Neurological: Negative for dizziness, syncope and headaches.   Psychiatric/Behavioral: Positive for decreased concentration (better). Negative for confusion and sleep disturbance. The patient is nervous/anxious.        Objective   /80 (BP Location: Left arm, Patient Position: Sitting, Cuff Size: Adult)  Pulse 57  Resp 20  Ht 182.9 cm (72\")  Wt 74.6 kg (164 lb 6.4 oz)  SpO2 98%  BMI 22.3 kg/m2  Physical Exam   Constitutional: He is oriented to person, place, and time. He appears well-developed and well-nourished.   HENT:   Head: Normocephalic and atraumatic.   Mouth/Throat: No oropharyngeal exudate.   Eyes: Conjunctivae are normal. Pupils are equal, round, and " reactive to light.   Neck: Neck supple. No thyromegaly present.   Cardiovascular: Normal rate and regular rhythm.    Pulmonary/Chest: Effort normal and breath sounds normal.   Abdominal: Soft. Bowel sounds are normal. He exhibits no distension. There is no tenderness.   Musculoskeletal: He exhibits no edema or tenderness.   Neurological: He is alert and oriented to person, place, and time. No cranial nerve deficit.   Skin: Skin is warm and dry.   Psychiatric: He has a normal mood and affect. Judgment normal.   Nursing note and vitals reviewed.        Results for orders placed or performed in visit on 05/25/17   TSH   Result Value Ref Range    TSH 1.558 0.350 - 5.350 mIU/mL   Comprehensive Metabolic Panel   Result Value Ref Range    Glucose 99 70 - 100 mg/dL    BUN 15 9 - 23 mg/dL    Creatinine 0.90 0.60 - 1.30 mg/dL    Sodium 140 132 - 146 mmol/L    Potassium 4.5 3.5 - 5.5 mmol/L    Chloride 108 99 - 109 mmol/L    CO2 29.0 20.0 - 31.0 mmol/L    Calcium 10.6 (H) 8.7 - 10.4 mg/dL    Total Protein 7.7 5.7 - 8.2 g/dL    Albumin 5.00 (H) 3.20 - 4.80 g/dL    ALT (SGPT) 11 7 - 40 U/L    AST (SGOT) 14 0 - 33 U/L    Alkaline Phosphatase 54 25 - 100 U/L    Total Bilirubin 1.0 0.3 - 1.2 mg/dL    eGFR Non African Amer 108 >60 mL/min/1.73    Globulin 2.7 gm/dL    A/G Ratio 1.9 1.5 - 2.5 g/dL    BUN/Creatinine Ratio 16.7 7.0 - 25.0    Anion Gap 3.0 3.0 - 11.0 mmol/L   CBC (No Diff)   Result Value Ref Range    WBC 4.67 4.50 - 13.50 10*3/mm3    RBC 4.91 4.20 - 5.76 10*6/mm3    Hemoglobin 15.2 13.1 - 17.5 g/dL    Hematocrit 44.8 38.9 - 50.9 %    MCV 91.2 80.0 - 99.0 fL    MCH 31.0 27.0 - 31.0 pg    MCHC 33.9 32.0 - 36.0 g/dL    RDW 12.2 11.3 - 14.5 %    RDW-SD 41.0 37.0 - 54.0 fl    MPV 10.9 6.0 - 12.0 fL    Platelets 208 150 - 450 10*3/mm3   Vitamin B12   Result Value Ref Range    Vitamin B-12 486 211 - 911 pg/mL             Assessment/Plan   Diagnoses and all orders for this visit:    Attention deficit hyperactivity disorder  (ADHD), combined type  -     Discontinue: amphetamine-dextroamphetamine XR (ADDERALL XR) 20 MG 24 hr capsule; Take 1 capsule by mouth Every Morning  -     amphetamine-dextroamphetamine XR (ADDERALL XR) 20 MG 24 hr capsule; Take 1 capsule by mouth Every Morning Fill after 2/9/18  -     escitalopram (LEXAPRO) 5 MG tablet; Take 1 tablet by mouth Daily.    Adv. To start low dose lexapro since unable to tolerate wellbutrin.       Adv. To skip adderall on days that he does not require much focus like weekends and Holidays.      Return in about 2 months (around 3/9/2018) for Next scheduled follow up.

## 2018-01-10 NOTE — TELEPHONE ENCOUNTER
Called Celia/Mom notes that he has been more aggressive and combative, feels maybe related to his Autism spectrum.Does not want to find a job.  Declining to see a counselor.  Hence would like any help from our side.  Wanted this to remain confidential as he would be upset with her.

## 2018-03-06 ENCOUNTER — OFFICE VISIT (OUTPATIENT)
Dept: INTERNAL MEDICINE | Facility: CLINIC | Age: 23
End: 2018-03-06

## 2018-03-06 VITALS
OXYGEN SATURATION: 98 % | HEART RATE: 88 BPM | BODY MASS INDEX: 21.81 KG/M2 | DIASTOLIC BLOOD PRESSURE: 84 MMHG | SYSTOLIC BLOOD PRESSURE: 142 MMHG | WEIGHT: 161 LBS | HEIGHT: 72 IN

## 2018-03-06 DIAGNOSIS — F43.89 REACTION TO CHRONIC STRESS: Primary | ICD-10-CM

## 2018-03-06 DIAGNOSIS — F90.2 ATTENTION DEFICIT HYPERACTIVITY DISORDER (ADHD), COMBINED TYPE: ICD-10-CM

## 2018-03-06 PROCEDURE — 99214 OFFICE O/P EST MOD 30 MIN: CPT | Performed by: INTERNAL MEDICINE

## 2018-03-06 RX ORDER — BUPROPION HYDROCHLORIDE 150 MG/1
150 TABLET ORAL DAILY
Qty: 30 TABLET | Refills: 3 | Status: SHIPPED | OUTPATIENT
Start: 2018-03-06 | End: 2018-05-07

## 2018-03-06 RX ORDER — DEXTROAMPHETAMINE SACCHARATE, AMPHETAMINE ASPARTATE MONOHYDRATE, DEXTROAMPHETAMINE SULFATE AND AMPHETAMINE SULFATE 5; 5; 5; 5 MG/1; MG/1; MG/1; MG/1
20 CAPSULE, EXTENDED RELEASE ORAL EVERY MORNING
Qty: 10 CAPSULE | Refills: 0 | Status: SHIPPED | OUTPATIENT
Start: 2018-03-06 | End: 2018-04-03 | Stop reason: SDUPTHER
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
## 2018-04-03 ENCOUNTER — OFFICE VISIT (OUTPATIENT)
Dept: INTERNAL MEDICINE | Facility: CLINIC | Age: 23
End: 2018-04-03

## 2018-04-03 VITALS
BODY MASS INDEX: 22.48 KG/M2 | DIASTOLIC BLOOD PRESSURE: 90 MMHG | WEIGHT: 166 LBS | SYSTOLIC BLOOD PRESSURE: 132 MMHG | OXYGEN SATURATION: 99 % | HEART RATE: 55 BPM | HEIGHT: 72 IN

## 2018-04-03 DIAGNOSIS — F90.2 ATTENTION DEFICIT HYPERACTIVITY DISORDER (ADHD), COMBINED TYPE: ICD-10-CM

## 2018-04-03 PROCEDURE — 99213 OFFICE O/P EST LOW 20 MIN: CPT | Performed by: INTERNAL MEDICINE

## 2018-04-03 RX ORDER — DEXTROAMPHETAMINE SACCHARATE, AMPHETAMINE ASPARTATE MONOHYDRATE, DEXTROAMPHETAMINE SULFATE AND AMPHETAMINE SULFATE 5; 5; 5; 5 MG/1; MG/1; MG/1; MG/1
20 CAPSULE, EXTENDED RELEASE ORAL EVERY MORNING
Qty: 30 CAPSULE | Refills: 0 | Status: SHIPPED | OUTPATIENT
Start: 2018-04-03 | End: 2018-05-07 | Stop reason: SDUPTHER

## 2018-04-03 NOTE — PROGRESS NOTES
"ADHD (4 wk fu)    Subjective   Henry Paz is a 23 y.o. male is here today for follow-up.    History of Present Illness   Reports zoloft not helping, hence hasn't been on it.  On the wellbutrin. Feels he is not functioning as well.  Off the adderall, would like to be started back on it.  He is earnest that he will not do it again.    Current Outpatient Prescriptions:   •  amphetamine-dextroamphetamine XR (ADDERALL XR) 20 MG 24 hr capsule, Take 1 capsule by mouth Every Morning, Disp: 30 capsule, Rfl: 0  •  buPROPion XL (WELLBUTRIN XL) 150 MG 24 hr tablet, Take 1 tablet by mouth Daily., Disp: 30 tablet, Rfl: 3      The following portions of the patient's history were reviewed and updated as appropriate: allergies, current medications, past family history, past medical history, past social history, past surgical history and problem list.    Review of Systems   Constitutional: Positive for fatigue. Negative for chills and fever.   HENT: Negative for ear discharge, ear pain, sinus pressure and sore throat.    Respiratory: Negative for cough, chest tightness and shortness of breath.    Cardiovascular: Negative for chest pain, palpitations and leg swelling.   Gastrointestinal: Negative for diarrhea, nausea and vomiting.   Musculoskeletal: Negative for arthralgias, back pain and myalgias.   Neurological: Negative for dizziness, syncope and headaches.   Psychiatric/Behavioral: Positive for behavioral problems and decreased concentration. Negative for confusion and sleep disturbance. The patient is nervous/anxious.        Objective   /90   Pulse 55   Ht 182.9 cm (72.01\")   Wt 75.3 kg (166 lb)   SpO2 99%   BMI 22.51 kg/m²   Physical Exam   Constitutional: He is oriented to person, place, and time. He appears well-developed and well-nourished.   HENT:   Head: Normocephalic and atraumatic.   Right Ear: External ear normal.   Left Ear: External ear normal.   Mouth/Throat: No oropharyngeal exudate.   Eyes: " Conjunctivae are normal. Pupils are equal, round, and reactive to light.   Neck: Neck supple. No thyromegaly present.   Cardiovascular: Normal rate, regular rhythm and intact distal pulses.    Pulmonary/Chest: Effort normal and breath sounds normal.   Abdominal: Soft. Bowel sounds are normal. He exhibits no distension. There is no tenderness.   Musculoskeletal: He exhibits no edema.   Neurological: He is alert and oriented to person, place, and time. No cranial nerve deficit.   Skin: Skin is warm and dry.   Psychiatric: Judgment normal. His mood appears anxious. His affect is blunt.   Nursing note and vitals reviewed.        Results for orders placed or performed in visit on 05/25/17   TSH   Result Value Ref Range    TSH 1.558 0.350 - 5.350 mIU/mL   Comprehensive Metabolic Panel   Result Value Ref Range    Glucose 99 70 - 100 mg/dL    BUN 15 9 - 23 mg/dL    Creatinine 0.90 0.60 - 1.30 mg/dL    Sodium 140 132 - 146 mmol/L    Potassium 4.5 3.5 - 5.5 mmol/L    Chloride 108 99 - 109 mmol/L    CO2 29.0 20.0 - 31.0 mmol/L    Calcium 10.6 (H) 8.7 - 10.4 mg/dL    Total Protein 7.7 5.7 - 8.2 g/dL    Albumin 5.00 (H) 3.20 - 4.80 g/dL    ALT (SGPT) 11 7 - 40 U/L    AST (SGOT) 14 0 - 33 U/L    Alkaline Phosphatase 54 25 - 100 U/L    Total Bilirubin 1.0 0.3 - 1.2 mg/dL    eGFR Non African Amer 108 >60 mL/min/1.73    Globulin 2.7 gm/dL    A/G Ratio 1.9 1.5 - 2.5 g/dL    BUN/Creatinine Ratio 16.7 7.0 - 25.0    Anion Gap 3.0 3.0 - 11.0 mmol/L   CBC (No Diff)   Result Value Ref Range    WBC 4.67 4.50 - 13.50 10*3/mm3    RBC 4.91 4.20 - 5.76 10*6/mm3    Hemoglobin 15.2 13.1 - 17.5 g/dL    Hematocrit 44.8 38.9 - 50.9 %    MCV 91.2 80.0 - 99.0 fL    MCH 31.0 27.0 - 31.0 pg    MCHC 33.9 32.0 - 36.0 g/dL    RDW 12.2 11.3 - 14.5 %    RDW-SD 41.0 37.0 - 54.0 fl    MPV 10.9 6.0 - 12.0 fL    Platelets 208 150 - 450 10*3/mm3   Vitamin B12   Result Value Ref Range    Vitamin B-12 486 211 - 911 pg/mL             Assessment/Plan   Diagnoses and  all orders for this visit:    Attention deficit hyperactivity disorder (ADHD), combined type  -     amphetamine-dextroamphetamine XR (ADDERALL XR) 20 MG 24 hr capsule; Take 1 capsule by mouth Every Morning             restart adderall, counseled strongly, that he should be more conscientious of his actions.Adv. To see counselor, for behavioral therapy, will reiterate the continuation of meds only if he is seen by one.    The patient has read and signed the [facility] Controlled Substance Contract.  I will continue to see patient for regular follow up appointments.  They are well controlled on their medication.  AIDEN has been reviewed by me and is updated every 3 months. The patient is aware of the potential for addiction and dependence.    Return in about 4 weeks (around 5/1/2018).

## 2018-05-07 ENCOUNTER — OFFICE VISIT (OUTPATIENT)
Dept: INTERNAL MEDICINE | Facility: CLINIC | Age: 23
End: 2018-05-07

## 2018-05-07 VITALS
WEIGHT: 169 LBS | HEIGHT: 72 IN | RESPIRATION RATE: 16 BRPM | OXYGEN SATURATION: 99 % | DIASTOLIC BLOOD PRESSURE: 76 MMHG | BODY MASS INDEX: 22.89 KG/M2 | SYSTOLIC BLOOD PRESSURE: 112 MMHG | HEART RATE: 61 BPM

## 2018-05-07 DIAGNOSIS — F90.2 ATTENTION DEFICIT HYPERACTIVITY DISORDER (ADHD), COMBINED TYPE: Primary | ICD-10-CM

## 2018-05-07 PROCEDURE — 99213 OFFICE O/P EST LOW 20 MIN: CPT | Performed by: INTERNAL MEDICINE

## 2018-05-07 RX ORDER — DEXTROAMPHETAMINE SACCHARATE, AMPHETAMINE ASPARTATE MONOHYDRATE, DEXTROAMPHETAMINE SULFATE AND AMPHETAMINE SULFATE 5; 5; 5; 5 MG/1; MG/1; MG/1; MG/1
20 CAPSULE, EXTENDED RELEASE ORAL EVERY MORNING
Qty: 30 CAPSULE | Refills: 0 | Status: SHIPPED | OUTPATIENT
Start: 2018-05-07 | End: 2018-09-17

## 2018-05-07 RX ORDER — ESCITALOPRAM OXALATE 5 MG/1
5 TABLET ORAL DAILY
Qty: 30 TABLET | Refills: 3 | Status: SHIPPED | OUTPATIENT
Start: 2018-05-07 | End: 2018-09-17 | Stop reason: SDUPTHER

## 2018-05-07 NOTE — PROGRESS NOTES
"Follow-up (ADHD)    Subjective   Clarisa Paz is a 23 y.o. male is here today for follow-up.    History of Present Illness   CLARISA IS HERE FOR A FOLLOW UP ON HIS add. HE IS S/P RECENT ATTEMPTED OD, AND WAS ADVISED TO F/U WITH PSYCH. He keeps refusing, reports he did seek counsleing but does not think it helps him at all and hasn't gone back.  His ADD sxs are controlled on the current regimen.    Current Outpatient Prescriptions:   •  amphetamine-dextroamphetamine XR (ADDERALL XR) 20 MG 24 hr capsule, Take 1 capsule by mouth Every Morning, Disp: 30 capsule, Rfl: 0  •  escitalopram (LEXAPRO) 5 MG tablet, Take 1 tablet by mouth Daily., Disp: 30 tablet, Rfl: 3      The following portions of the patient's history were reviewed and updated as appropriate: allergies, current medications, past family history, past medical history, past social history, past surgical history and problem list.    Review of Systems   Constitutional: Negative.  Negative for chills and fever.   HENT: Negative for ear discharge, ear pain, sinus pressure and sore throat.    Respiratory: Negative for cough, chest tightness and shortness of breath.    Cardiovascular: Negative for chest pain, palpitations and leg swelling.   Gastrointestinal: Negative for diarrhea, nausea and vomiting.   Musculoskeletal: Negative for arthralgias, back pain and myalgias.   Neurological: Negative for dizziness, syncope and headaches.   Psychiatric/Behavioral: Positive for agitation. Negative for confusion, sleep disturbance and suicidal ideas.       Objective   /76 (BP Location: Right arm, Patient Position: Sitting)   Pulse 61   Resp 16   Ht 182.9 cm (72\")   Wt 76.7 kg (169 lb)   SpO2 99%   BMI 22.92 kg/m²   Physical Exam   Constitutional: He is oriented to person, place, and time. He appears well-developed and well-nourished.   HENT:   Head: Normocephalic and atraumatic.   Mouth/Throat: No oropharyngeal exudate.   Cardiovascular: Normal rate and regular " rhythm.    Pulmonary/Chest: Effort normal and breath sounds normal.   Musculoskeletal: He exhibits no edema.   Neurological: He is alert and oriented to person, place, and time. No cranial nerve deficit.   Skin: Skin is warm and dry.   Psychiatric: He has a normal mood and affect. Judgment normal.   Nursing note and vitals reviewed.        Results for orders placed or performed in visit on 05/25/17   TSH   Result Value Ref Range    TSH 1.558 0.350 - 5.350 mIU/mL   Comprehensive Metabolic Panel   Result Value Ref Range    Glucose 99 70 - 100 mg/dL    BUN 15 9 - 23 mg/dL    Creatinine 0.90 0.60 - 1.30 mg/dL    Sodium 140 132 - 146 mmol/L    Potassium 4.5 3.5 - 5.5 mmol/L    Chloride 108 99 - 109 mmol/L    CO2 29.0 20.0 - 31.0 mmol/L    Calcium 10.6 (H) 8.7 - 10.4 mg/dL    Total Protein 7.7 5.7 - 8.2 g/dL    Albumin 5.00 (H) 3.20 - 4.80 g/dL    ALT (SGPT) 11 7 - 40 U/L    AST (SGOT) 14 0 - 33 U/L    Alkaline Phosphatase 54 25 - 100 U/L    Total Bilirubin 1.0 0.3 - 1.2 mg/dL    eGFR Non African Amer 108 >60 mL/min/1.73    Globulin 2.7 gm/dL    A/G Ratio 1.9 1.5 - 2.5 g/dL    BUN/Creatinine Ratio 16.7 7.0 - 25.0    Anion Gap 3.0 3.0 - 11.0 mmol/L   CBC (No Diff)   Result Value Ref Range    WBC 4.67 4.50 - 13.50 10*3/mm3    RBC 4.91 4.20 - 5.76 10*6/mm3    Hemoglobin 15.2 13.1 - 17.5 g/dL    Hematocrit 44.8 38.9 - 50.9 %    MCV 91.2 80.0 - 99.0 fL    MCH 31.0 27.0 - 31.0 pg    MCHC 33.9 32.0 - 36.0 g/dL    RDW 12.2 11.3 - 14.5 %    RDW-SD 41.0 37.0 - 54.0 fl    MPV 10.9 6.0 - 12.0 fL    Platelets 208 150 - 450 10*3/mm3   Vitamin B12   Result Value Ref Range    Vitamin B-12 486 211 - 911 pg/mL             Assessment/Plan   Diagnoses and all orders for this visit:    Attention deficit hyperactivity disorder (ADHD), combined type  -     amphetamine-dextroamphetamine XR (ADDERALL XR) 20 MG 24 hr capsule; Take 1 capsule by mouth Every Morning  -     escitalopram (LEXAPRO) 5 MG tablet; Take 1 tablet by mouth Daily.  -     Urine  Drug Screen - Urine, Clean Catch    A little defiant regd. Med changes and counseling, reiterated to go back to counseling.             Return in about 1 month (around 6/7/2018).

## 2018-07-10 ENCOUNTER — OFFICE VISIT (OUTPATIENT)
Dept: INTERNAL MEDICINE | Facility: CLINIC | Age: 23
End: 2018-07-10

## 2018-07-10 VITALS
WEIGHT: 169.6 LBS | DIASTOLIC BLOOD PRESSURE: 78 MMHG | SYSTOLIC BLOOD PRESSURE: 112 MMHG | BODY MASS INDEX: 23 KG/M2 | HEART RATE: 47 BPM | OXYGEN SATURATION: 98 %

## 2018-07-10 DIAGNOSIS — M75.52 SUBACROMIAL BURSITIS OF LEFT SHOULDER JOINT: Primary | ICD-10-CM

## 2018-07-10 PROCEDURE — 99213 OFFICE O/P EST LOW 20 MIN: CPT | Performed by: INTERNAL MEDICINE

## 2018-07-10 RX ORDER — IBUPROFEN 600 MG/1
600 TABLET ORAL 2 TIMES DAILY PRN
Qty: 30 TABLET | Refills: 0 | Status: SHIPPED | OUTPATIENT
Start: 2018-07-10

## 2018-07-10 NOTE — PROGRESS NOTES
left shoulder pain (started about a week ago, slipped on gravel and landed on it.)    Subjective   Henry Paz is a 23 y.o. male is here today for follow-up.    History of Present Illness   Slipped on gravel last week, and fell and landed on his shoulder.  Has been taking a break from the adderall for the Summer.    Current Outpatient Prescriptions:   •  amphetamine-dextroamphetamine XR (ADDERALL XR) 20 MG 24 hr capsule, Take 1 capsule by mouth Every Morning, Disp: 30 capsule, Rfl: 0  •  escitalopram (LEXAPRO) 5 MG tablet, Take 1 tablet by mouth Daily., Disp: 30 tablet, Rfl: 3  •  ibuprofen (ADVIL,MOTRIN) 600 MG tablet, Take 1 tablet by mouth 2 (Two) Times a Day As Needed for Mild Pain . With food, Disp: 30 tablet, Rfl: 0      The following portions of the patient's history were reviewed and updated as appropriate: allergies, current medications, past family history, past medical history, past social history, past surgical history and problem list.    Review of Systems   Constitutional: Negative.  Negative for chills and fever.   HENT: Negative for ear discharge, ear pain, sinus pressure and sore throat.    Respiratory: Negative for cough, chest tightness and shortness of breath.    Cardiovascular: Negative for chest pain, palpitations and leg swelling.   Gastrointestinal: Negative for diarrhea, nausea and vomiting.   Musculoskeletal: Positive for arthralgias and myalgias. Negative for back pain.   Neurological: Negative for dizziness, syncope and headaches.   Psychiatric/Behavioral: Negative for confusion and sleep disturbance.       Objective   /78   Pulse (!) 47   Wt 76.9 kg (169 lb 9.6 oz)   SpO2 98%   BMI 23.00 kg/m²   Physical Exam   Constitutional: He is oriented to person, place, and time. He appears well-developed and well-nourished.   HENT:   Head: Normocephalic and atraumatic.   Mouth/Throat: No oropharyngeal exudate.   Cardiovascular: Normal rate and regular rhythm.    Pulmonary/Chest:  Effort normal and breath sounds normal.   Abdominal: Soft. Bowel sounds are normal. He exhibits no distension. There is no tenderness.   Musculoskeletal: He exhibits edema and tenderness (over left acromioclavicular joint.).   Left shoulder decreased rom to abduction- overhead, past 90 degrees.   Neurological: He is alert and oriented to person, place, and time. No cranial nerve deficit.   Skin: Skin is warm and dry.   Psychiatric: He has a normal mood and affect. Judgment normal.   Nursing note and vitals reviewed.        Results for orders placed or performed in visit on 05/25/17   TSH   Result Value Ref Range    TSH 1.558 0.350 - 5.350 mIU/mL   Comprehensive Metabolic Panel   Result Value Ref Range    Glucose 99 70 - 100 mg/dL    BUN 15 9 - 23 mg/dL    Creatinine 0.90 0.60 - 1.30 mg/dL    Sodium 140 132 - 146 mmol/L    Potassium 4.5 3.5 - 5.5 mmol/L    Chloride 108 99 - 109 mmol/L    CO2 29.0 20.0 - 31.0 mmol/L    Calcium 10.6 (H) 8.7 - 10.4 mg/dL    Total Protein 7.7 5.7 - 8.2 g/dL    Albumin 5.00 (H) 3.20 - 4.80 g/dL    ALT (SGPT) 11 7 - 40 U/L    AST (SGOT) 14 0 - 33 U/L    Alkaline Phosphatase 54 25 - 100 U/L    Total Bilirubin 1.0 0.3 - 1.2 mg/dL    eGFR Non African Amer 108 >60 mL/min/1.73    Globulin 2.7 gm/dL    A/G Ratio 1.9 1.5 - 2.5 g/dL    BUN/Creatinine Ratio 16.7 7.0 - 25.0    Anion Gap 3.0 3.0 - 11.0 mmol/L   CBC (No Diff)   Result Value Ref Range    WBC 4.67 4.50 - 13.50 10*3/mm3    RBC 4.91 4.20 - 5.76 10*6/mm3    Hemoglobin 15.2 13.1 - 17.5 g/dL    Hematocrit 44.8 38.9 - 50.9 %    MCV 91.2 80.0 - 99.0 fL    MCH 31.0 27.0 - 31.0 pg    MCHC 33.9 32.0 - 36.0 g/dL    RDW 12.2 11.3 - 14.5 %    RDW-SD 41.0 37.0 - 54.0 fl    MPV 10.9 6.0 - 12.0 fL    Platelets 208 150 - 450 10*3/mm3   Vitamin B12   Result Value Ref Range    Vitamin B-12 486 211 - 911 pg/mL             Assessment/Plan   Diagnoses and all orders for this visit:    Subacromial bursitis of left shoulder joint  -     ibuprofen  (ADVIL,MOTRIN) 600 MG tablet; Take 1 tablet by mouth 2 (Two) Times a Day As Needed for Mild Pain . With food  -     Ambulatory Referral to Physical Therapy Evaluate and treat             Return for Next scheduled follow up.

## 2018-08-09 DIAGNOSIS — F90.2 ATTENTION DEFICIT HYPERACTIVITY DISORDER (ADHD), COMBINED TYPE: ICD-10-CM

## 2018-08-10 RX ORDER — DEXTROAMPHETAMINE SACCHARATE, AMPHETAMINE ASPARTATE MONOHYDRATE, DEXTROAMPHETAMINE SULFATE AND AMPHETAMINE SULFATE 5; 5; 5; 5 MG/1; MG/1; MG/1; MG/1
20 CAPSULE, EXTENDED RELEASE ORAL EVERY MORNING
Qty: 30 CAPSULE | Refills: 0 | OUTPATIENT
Start: 2018-08-10

## 2018-08-10 NOTE — TELEPHONE ENCOUNTER
His UDS done in may was negative for medicine and positive for Marijuana.  I am unable to Rx any controlled meds at this time.  We can discuss other options at his appointment.

## 2018-08-10 NOTE — TELEPHONE ENCOUNTER
PATIENT CALLED TO CHECK ON STATUS OF MED REFILL.. PLEASE ADVISE PATIENT ON DR. SUSAN RODRÍGUEZ. THANKS!

## 2018-09-17 ENCOUNTER — OFFICE VISIT (OUTPATIENT)
Dept: INTERNAL MEDICINE | Facility: CLINIC | Age: 23
End: 2018-09-17

## 2018-09-17 VITALS
HEART RATE: 66 BPM | OXYGEN SATURATION: 98 % | HEIGHT: 72 IN | BODY MASS INDEX: 23.51 KG/M2 | DIASTOLIC BLOOD PRESSURE: 72 MMHG | RESPIRATION RATE: 16 BRPM | SYSTOLIC BLOOD PRESSURE: 100 MMHG | WEIGHT: 173.6 LBS

## 2018-09-17 DIAGNOSIS — F90.2 ATTENTION DEFICIT HYPERACTIVITY DISORDER (ADHD), COMBINED TYPE: ICD-10-CM

## 2018-09-17 PROCEDURE — 99213 OFFICE O/P EST LOW 20 MIN: CPT | Performed by: INTERNAL MEDICINE

## 2018-09-17 RX ORDER — ESCITALOPRAM OXALATE 10 MG/1
10 TABLET ORAL DAILY
Qty: 30 TABLET | Refills: 5 | Status: SHIPPED | OUTPATIENT
Start: 2018-09-17

## 2018-09-17 NOTE — PROGRESS NOTES
"ADHD ( follow-up)    Subjective   Henry Paz is a 23 y.o. male is here today for follow-up.    History of Present Illness   Henry, is here for a follow, up , would like to go back on his ADD meds. Feels less focused, and trouble staying on task. 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    Current Outpatient Prescriptions:   •  escitalopram (LEXAPRO) 10 MG tablet, Take 1 tablet by mouth Daily., Disp: 30 tablet, Rfl: 5  •  ibuprofen (ADVIL,MOTRIN) 600 MG tablet, Take 1 tablet by mouth 2 (Two) Times a Day As Needed for Mild Pain . With food, Disp: 30 tablet, Rfl: 0      The following portions of the patient's history were reviewed and updated as appropriate: allergies, current medications, past family history, past medical history, past social history, past surgical history and problem list.    Review of Systems   Constitutional: Negative for fatigue and unexpected weight change.   Cardiovascular: Negative for chest pain and palpitations.   Neurological: Negative for weakness.   Psychiatric/Behavioral: Positive for decreased concentration. The patient is nervous/anxious.        Objective   /72   Pulse 66   Resp 16   Ht 182.9 cm (72.01\")   Wt 78.7 kg (173 lb 9.6 oz)   SpO2 98%   BMI 23.54 kg/m²   Physical Exam   Constitutional: He is oriented to person, place, and time. He appears well-developed and well-nourished.   Cardiovascular: Normal rate and regular rhythm.    Pulmonary/Chest: Effort normal and breath sounds normal.   Abdominal: Soft. Bowel sounds are normal.   Neurological: He is alert and oriented to person, place, and time.         Results for orders placed or performed in visit on 05/25/17   TSH   Result Value Ref Range    TSH 1.558 0.350 - 5.350 mIU/mL   Comprehensive Metabolic Panel   Result Value Ref Range    Glucose 99 70 - 100 mg/dL    BUN 15 9 - 23 mg/dL    Creatinine 0.90 0.60 - 1.30 mg/dL    Sodium 140 132 - 146 mmol/L    Potassium 4.5 3.5 - " 5.5 mmol/L    Chloride 108 99 - 109 mmol/L    CO2 29.0 20.0 - 31.0 mmol/L    Calcium 10.6 (H) 8.7 - 10.4 mg/dL    Total Protein 7.7 5.7 - 8.2 g/dL    Albumin 5.00 (H) 3.20 - 4.80 g/dL    ALT (SGPT) 11 7 - 40 U/L    AST (SGOT) 14 0 - 33 U/L    Alkaline Phosphatase 54 25 - 100 U/L    Total Bilirubin 1.0 0.3 - 1.2 mg/dL    eGFR Non African Amer 108 >60 mL/min/1.73    Globulin 2.7 gm/dL    A/G Ratio 1.9 1.5 - 2.5 g/dL    BUN/Creatinine Ratio 16.7 7.0 - 25.0    Anion Gap 3.0 3.0 - 11.0 mmol/L   CBC (No Diff)   Result Value Ref Range    WBC 4.67 4.50 - 13.50 10*3/mm3    RBC 4.91 4.20 - 5.76 10*6/mm3    Hemoglobin 15.2 13.1 - 17.5 g/dL    Hematocrit 44.8 38.9 - 50.9 %    MCV 91.2 80.0 - 99.0 fL    MCH 31.0 27.0 - 31.0 pg    MCHC 33.9 32.0 - 36.0 g/dL    RDW 12.2 11.3 - 14.5 %    RDW-SD 41.0 37.0 - 54.0 fl    MPV 10.9 6.0 - 12.0 fL    Platelets 208 150 - 450 10*3/mm3   Vitamin B12   Result Value Ref Range    Vitamin B-12 486 211 - 911 pg/mL             Assessment/Plan   Diagnoses and all orders for this visit:    Attention deficit hyperactivity disorder (ADHD), combined type  -     escitalopram (LEXAPRO) 10 MG tablet; Take 1 tablet by mouth Daily.  -     Ambulatory Referral to Psychiatry      Advised patient that due to his UDS being positive for THC and negative for the amphetamines, during the time he received the Rx, unable to prescribe it anymore.  Trial of lexapro, which he wasn't very much interested in.  He reassures me that he will not smoke pot anymore. Suggested seeing a Psychiatrist , who can formally evaluate him.  Given self referral list as well.    Total time spent today with Henry Paz  was15 minutes (level 3).  Of this time, 75% was spent face-to-face time coordinating care, answering her questions and counseling regarding pathophysiology of her presenting problem along with plans for any diagnositc work-up and treatment.           Return for Next scheduled follow up.